# Patient Record
Sex: MALE | Race: WHITE
[De-identification: names, ages, dates, MRNs, and addresses within clinical notes are randomized per-mention and may not be internally consistent; named-entity substitution may affect disease eponyms.]

---

## 2019-12-26 ENCOUNTER — HOSPITAL ENCOUNTER (INPATIENT)
Dept: HOSPITAL 95 - ER | Age: 64
LOS: 10 days | Discharge: HOME HEALTH | DRG: 871 | End: 2020-01-05
Attending: HOSPITALIST | Admitting: HOSPITALIST
Payer: COMMERCIAL

## 2019-12-26 VITALS — BODY MASS INDEX: 14.33 KG/M2 | WEIGHT: 100.09 LBS | HEIGHT: 70 IN

## 2019-12-26 DIAGNOSIS — E44.0: ICD-10-CM

## 2019-12-26 DIAGNOSIS — I10: ICD-10-CM

## 2019-12-26 DIAGNOSIS — J10.00: ICD-10-CM

## 2019-12-26 DIAGNOSIS — A41.89: Primary | ICD-10-CM

## 2019-12-26 DIAGNOSIS — J96.21: ICD-10-CM

## 2019-12-26 DIAGNOSIS — Z91.19: ICD-10-CM

## 2019-12-26 DIAGNOSIS — F41.9: ICD-10-CM

## 2019-12-26 DIAGNOSIS — F17.210: ICD-10-CM

## 2019-12-26 DIAGNOSIS — J44.1: ICD-10-CM

## 2019-12-26 DIAGNOSIS — J98.11: ICD-10-CM

## 2019-12-26 DIAGNOSIS — F41.0: ICD-10-CM

## 2019-12-26 LAB
ALBUMIN SERPL BCP-MCNC: 3.5 G/DL (ref 3.4–5)
ALBUMIN/GLOB SERPL: 0.8 {RATIO} (ref 0.8–1.8)
ALT SERPL W P-5'-P-CCNC: 39 U/L (ref 12–78)
ANION GAP SERPL CALCULATED.4IONS-SCNC: 9 MMOL/L (ref 6–16)
AST SERPL W P-5'-P-CCNC: 48 U/L (ref 12–37)
BASOPHILS # BLD AUTO: 0.03 K/MM3 (ref 0–0.23)
BASOPHILS NFR BLD AUTO: 0 % (ref 0–2)
BILIRUB SERPL-MCNC: 0.4 MG/DL (ref 0.1–1)
BUN SERPL-MCNC: 50 MG/DL (ref 8–24)
CALCIUM SERPL-MCNC: 9 MG/DL (ref 8.5–10.1)
CHLORIDE SERPL-SCNC: 100 MMOL/L (ref 98–108)
CO2 SERPL-SCNC: 26 MMOL/L (ref 21–32)
CREAT SERPL-MCNC: 1 MG/DL (ref 0.6–1.2)
DEPRECATED RDW RBC AUTO: 44.1 FL (ref 35.1–46.3)
EOSINOPHIL # BLD AUTO: 0 K/MM3 (ref 0–0.68)
EOSINOPHIL NFR BLD AUTO: 0 % (ref 0–6)
ERYTHROCYTE [DISTWIDTH] IN BLOOD BY AUTOMATED COUNT: 12.2 % (ref 11.7–14.2)
GLOBULIN SER CALC-MCNC: 4.5 G/DL (ref 2.2–4)
GLUCOSE SERPL-MCNC: 107 MG/DL (ref 70–99)
HCT VFR BLD AUTO: 51.3 % (ref 37–53)
HGB BLD-MCNC: 17.3 G/DL (ref 13.5–17.5)
IMM GRANULOCYTES # BLD AUTO: 0.03 K/MM3 (ref 0–0.1)
IMM GRANULOCYTES NFR BLD AUTO: 0 % (ref 0–1)
LYMPHOCYTES # BLD AUTO: 0.78 K/MM3 (ref 0.84–5.2)
LYMPHOCYTES NFR BLD AUTO: 9 % (ref 21–46)
MCHC RBC AUTO-ENTMCNC: 33.7 G/DL (ref 31.5–36.5)
MCV RBC AUTO: 97 FL (ref 80–100)
MONOCYTES # BLD AUTO: 1.25 K/MM3 (ref 0.16–1.47)
MONOCYTES NFR BLD AUTO: 15 % (ref 4–13)
NEUTROPHILS # BLD AUTO: 6.21 K/MM3 (ref 1.96–9.15)
NEUTROPHILS NFR BLD AUTO: 75 % (ref 41–73)
NRBC # BLD AUTO: 0 K/MM3 (ref 0–0.02)
NRBC BLD AUTO-RTO: 0 /100 WBC (ref 0–0.2)
PH BLDA: 7.41 [PH] (ref 7.35–7.45)
PLATELET # BLD AUTO: 196 K/MM3 (ref 150–400)
POTASSIUM SERPL-SCNC: 5.1 MMOL/L (ref 3.5–5.5)
PROT SERPL-MCNC: 8 G/DL (ref 6.4–8.2)
SODIUM SERPL-SCNC: 135 MMOL/L (ref 136–145)
TROPONIN I SERPL-MCNC: <0.015 NG/ML (ref 0–0.04)

## 2019-12-26 PROCEDURE — C9113 INJ PANTOPRAZOLE SODIUM, VIA: HCPCS

## 2019-12-26 NOTE — NUR
PLACED EGG CRATE ON MATTRESS, MOVED PROBE TO l HAND SINCE HE REMOVED IT FROM
R, REPLACED MASK WHEN "CAME OFF WHILE ASLEEP", MEDICATED AS PRESCRIBED, WILL
CONTINUE TO MONITOR AND TREAT AS PRESCRIBED, CALL LIGHT IN REACH, BED IN LOW
POSITION, SITTING UP WATCHING TV AND USING PHONE

## 2019-12-26 NOTE — NUR
received report from day shift, pt a+o, SOB,
SITTING ON BED WITH PIZZA ON TABLE, CPAP RUNNING AT 6/12 AT 35%, denies
pain, move limbs well, profused well, < 3 sec refil, strong pulses, neuro
ck +, ls dim all, abdmn + bt 4q, skin wnl, 94% o2, will continue to monitor
and ck, calllight and possessions within reach, bed in low position, alarm on

## 2019-12-26 NOTE — NUR
WHEN PT ARRIVED TO THE ROOM HE WAS INSTRUCTED TO NOT GET OOB, ESPECIALLY NOT
WITH OUT CALLING FOR HELP, HE WAS GIVEN A URINAL, HE GRAND DAUGHTER WAS HERE
AND CALLED ME INTO THE ROOM. PT WAS STANDING BY THE BED HOLDING ONTO THE
TABLE, SATS IN THE 80'S, GOT HIS JEANS OFF, AND GOT HIM BACK TO BED AND BACK
ON BIPAP, EXPLAINED AGAIN WHY HE CANT DO THIS, THAT HE NEEDS TO USE URINAL IN
BED. BED ALARM ACTIVATED.

## 2019-12-26 NOTE — NUR
pt arrived to pcu 3 via gurney from ed, report obtained from Lindsay BERRY. he
is currently on bipap, lungs are very dim, exp wheezing, hrr, tele in place
running st in low 100's, vs stable, afebrile, iv site is clear and patent,
btx4, abd flat soft nontender, voids without diff, skin c/w/d, maew, reji,
oriented to room layout and call system, call light in reach.

## 2019-12-27 LAB
ANION GAP SERPL CALCULATED.4IONS-SCNC: 5 MMOL/L (ref 6–16)
BASOPHILS # BLD AUTO: 0.01 K/MM3 (ref 0–0.23)
BASOPHILS NFR BLD AUTO: 0 % (ref 0–2)
BUN SERPL-MCNC: 38 MG/DL (ref 8–24)
CALCIUM SERPL-MCNC: 8.2 MG/DL (ref 8.5–10.1)
CHLORIDE SERPL-SCNC: 110 MMOL/L (ref 98–108)
CO2 SERPL-SCNC: 27 MMOL/L (ref 21–32)
CREAT SERPL-MCNC: 0.71 MG/DL (ref 0.6–1.2)
DEPRECATED RDW RBC AUTO: 44.6 FL (ref 35.1–46.3)
EOSINOPHIL # BLD AUTO: 0 K/MM3 (ref 0–0.68)
EOSINOPHIL NFR BLD AUTO: 0 % (ref 0–6)
ERYTHROCYTE [DISTWIDTH] IN BLOOD BY AUTOMATED COUNT: 12.4 % (ref 11.7–14.2)
GLUCOSE SERPL-MCNC: 125 MG/DL (ref 70–99)
HCT VFR BLD AUTO: 42 % (ref 37–53)
HGB BLD-MCNC: 14.2 G/DL (ref 13.5–17.5)
IMM GRANULOCYTES # BLD AUTO: 0.02 K/MM3 (ref 0–0.1)
IMM GRANULOCYTES NFR BLD AUTO: 0 % (ref 0–1)
LYMPHOCYTES # BLD AUTO: 0.51 K/MM3 (ref 0.84–5.2)
LYMPHOCYTES NFR BLD AUTO: 10 % (ref 21–46)
MAGNESIUM SERPL-MCNC: 2.2 MG/DL (ref 1.6–2.4)
MCHC RBC AUTO-ENTMCNC: 33.8 G/DL (ref 31.5–36.5)
MCV RBC AUTO: 98 FL (ref 80–100)
MONOCYTES # BLD AUTO: 0.35 K/MM3 (ref 0.16–1.47)
MONOCYTES NFR BLD AUTO: 7 % (ref 4–13)
NEUTROPHILS # BLD AUTO: 4.46 K/MM3 (ref 1.96–9.15)
NEUTROPHILS NFR BLD AUTO: 83 % (ref 41–73)
NRBC # BLD AUTO: 0 K/MM3 (ref 0–0.02)
NRBC BLD AUTO-RTO: 0 /100 WBC (ref 0–0.2)
PLATELET # BLD AUTO: 156 K/MM3 (ref 150–400)
POTASSIUM SERPL-SCNC: 4.8 MMOL/L (ref 3.5–5.5)
SODIUM SERPL-SCNC: 142 MMOL/L (ref 136–145)

## 2019-12-27 NOTE — NUR
asked to have cpap removed after 5:30, replaced with nc, stats remaining above
alarm level but when it did drop just reminding pt to breath through nose
brought up level from 84 to 94 quickly, asking for breakfast but not
interested in anything offered as snack, call light in reach, will continue to
monitior and treat until share bsr with pt, bed in low position, ns infusing
at 75 with no s/sx of infection or infiltration

## 2019-12-27 NOTE — NUR
BEGINNING SHIFT SUMMARY
ASSUMED CARE OF PT AT 1900. PT IS A/O X4, PT IS ANIXOUS AT TIMES WHEN
BREATHING BECOMES DIFFICULT, PT HAS DIFFICULTY SWALLOWING PILLS DUE TO SOB.
HEART SOUNDS IRREGULAR, LUNG SOUNDS TIGHT WITH EXPIRATORY WHEEZES, PT ON 3L
NASAL CANNULA, RA AT BASELINE, PT ON CPAP AT NIGHT. PT STATED FEELING HOT,
SKIN IS FLUSHED AND WARM TO TOUCH. PT IN TRIPOD POSITION, RT NOTIFIED,
BREATHING TREATMENTS GIVEN. PT IS CURRENTLY SLEEPING, O2 SATURATION ABOUVE
95%, CALL LIGHT IN REACH, BED IN LOWEST POSTION, WILL CONTINUE TO MONITOR.

## 2019-12-27 NOTE — NUR
UPDATE
ASSUMED CARE APPROXIMATELY 0730. PT ALERT AND ORIENTED. VS STABLE. O2 SATS
REMAIN ABOVE 90% ON 3L NC. LS WHEEZES IN THE UPPERS AND DIM IN THE BASES. HR
NSR ON THE MONITOR. DR. POON IN WITH ORDERS FOR TRANSFER TO MEDICAL FLOOR AND
DC TELEMETRY. REPORT CALLED TO MEDICAL FLOOR RN. PT TAKEN UP BY WHEELCHAIR.

## 2019-12-27 NOTE — NUR
SHIFT SUMMARY
PT TRANSFERED FROM PCU REPORT TAKEN FROM GABRIELA BERRY. PT TRANSPORTED BY W/C ON
3L NC. PT COMPLIAING OF SOB, PT STATING AT 93% ON ARRIVAL
.  PT IS EXCERTING EASILY AND
CHOOSING TO USE URINAL WHEN NEEDED. PT HAS A POOR APETITE. PT CHOOSE TO SIT IN
TRIPOD POSITION TO REDUCE AIR HUNGER.  PT'S LUNGS ARE WHEEZY IN THE UPPER
BILATERAL LOBES AND DIMINSHED BILATERALY IN THE LOWER LOBES. PT HAS IV IN LLRA
FLUSHES EASILY, PT REPORTS TENDER TO TOUCH AND DECLINES NEW IV START AT THIS
TIME.

## 2019-12-28 NOTE — NUR
SHIFT SUMMARY
 
PATIENT A/O. MAKES NEEDS KNOWN. PATIENT ATTEMPTED TO BE WEANED FROM OXYGEN
DURING SHIFT AND PATIENT TOLERATED WELL WITHOUT ACTIVITY. WITH ACTIVITY
PATIENT BECAME SOB. SOB MANAGED THROUGH SCHEDULED/PRN BREATHING TX. LN TO
CONTINUE TO MONITOR.

## 2019-12-28 NOTE — NUR
END SHIFT SUMMARY
NO ACUTE CHANGES T/O THE NIGHT. PT SLEPT UNTIL 0300 WHEN HE AWOKEN BECAUSE
"THIS IS WHEN I NOTMALLY GET UP" STATED THE PT. PT IS ON NASAL CANNULA, STATES
THAT HE IS FEELING BETTER TODAY, HAS SOME SOB. CALL LIGHT IN REACH, BED IN
LOWEST POSTION, WILL CONTINUE TO MONITOR UNTIL DAYSHIFT NURSE ARRIVES.

## 2019-12-28 NOTE — NUR
PATIENT SATURATION O2
 
PROVIDER REQUESTED PATIENT TO BE WEANED OFF O2. PATIENT ON ROOM AIR AT THIS
TIME AND SATS ARE AROUND 90%. WILL CONTINUE TO MONITOR

## 2019-12-29 NOTE — NUR
PATIENT SOB/ANXIETY, PROVIDER NOTIFICATION
 
DURING SHIFT CHANGE REPORT, PATIENT BECAME SOB AND AS A RESULT PATIENT PULSE
WENT 'S AND DESAT O2 TO AS LOW AS 80%-84%, RESPIRATORY THERAPY
INTERVENTION OF BIPAP PLACEMENT AND CLEAR INSTRUCTION THAT PATIENT IS TO
CONTACT NURSE OR RESPIRATORY THERAPY TO ASSIST WITH BIPAP OR O2 NC. PROVIDER
NOTIFIED OF POSSIBLE ANXIETY AND OF OCCURENCE OF DESAT. PROVIDER WISHES TO
ROUND ON PATIENT THIS AM BEFORE CHANGES.

## 2019-12-29 NOTE — NUR
0623 CONDITION
STATED HAVING A HARD TIME BREATHING. DID NO HAVE HIS O2 ON; STATED WAS NOT
DOING ANYTHING FOR HIM ANYWAYS. NOTIFIED RT; STATED WOULD BE UP AS SOON
AS THEY COULD AND TO PLACE ON HIS BIPAP. WCTM.

## 2019-12-29 NOTE — NUR
0638
CHECKED IN ON PATIENT. STATED FEELING MUCH BETTER. RT HAS YET TO COME BY.
[facility].

## 2019-12-29 NOTE — NUR
SHIFT SUMMARY
A/O, ABLE TO MAKE NEEDS KNOWN. COOPERATIVE WITH CARE. CALLS AND ANSWERS
QUESTIONS APPROPRIATELY. NO C/O PAIN/DISCOMFORT. INDEPENDENT IN ROOM;
ALTHOUGH, BECOMES DYSPNEIC UPON EXERTION. STATES A GOAL FOR THIS DAY 12/29/19
IS TO GET UP AND MOVE AROUND MORE. NO ACUTE CHANGES NOTED. APPEARED TO REST
MUCH OF SHIFT. BP ELEVATED, BUT APPEARS ON TREND WITH PREVIOUS PRESSURES. RR
REMAINS TACHY. BED IN LOWEST POSITION. CALL LIGHT AND BELONGINGS WITHIN REACH.
WCTM. REPORT TO ONCOMING RN.

## 2019-12-29 NOTE — NUR
SHIFT SUMMARY
 
PATIENT A/O FULLY. PATIENT HAD A MOMENT OF DESAT OCCUR DURING EARLY SHIFT, SEE
NOTES. PROVIDER AWARE. RESPIRATORY THERAPY INTERENTIONS APPEARED TO HAVE BEEN
EFFECTIVE FOR PATIENT. NO ADDITIONAL DESAT OCCURED DURING SHIFT. PATIENT
REMAINS ON PRECAUTIONS. PATIENT ALTERNATING BETWEEN BIPAP AND O2 NC. PATIENT
APPETITE IS POOR. LN TO CONTINUE TO MONITOR.

## 2019-12-30 NOTE — NUR
BEGINNING SHIFT SUMMARY
ASSUMED CARE OF PT AT 1900. PT WAS VISITING WITH FAMILY. PT IS A/O X4, PT
STATES THAT HE IS FEELING A LOT BETTER THAN HE DID YESTERDAY. HEART SOUNDS
IRREGULAR, LUNG SOUNDS CLEAR BUT DIMINISHED, PT ON BIPAP AT THIS TIME. PT HAS
DIFFICULTY SWOLLOWING PILLS DUE TO SOB. PT IS CURRENTLY SLEEPING WITH HIS
BIPAP ON, CALL LIGHT IN REACH, BED IN LOWEST POSTION, WILL CONTINUE TO
MONITOR.

## 2019-12-30 NOTE — NUR
SHIFT SUMMARY
A/O, ABLE TO MAKE NEEDS KNOWN. COOPERATIVE WITH CARE. CALLS AND ANSWERS
QUESTIONS APPROPRIATLEY. APPEARED TO REST MUCH OF SHIFT. WORE BIPAP THROUGHOUT
NIGHT. REMAINS HYPERTENSIVE; ALL OTHER VS WNL/AFEBRILE. NO ACUTE CHANGES NOTED
OVERNIGHT. BED IN LOWEST POSITION. CALL LIGHT AND BELONGINGS WITHIN REACH.
WCTM. REPORT TO ONCOMING RN.

## 2019-12-30 NOTE — NUR
SHIFT SUMMARY
PT ON & OFF 2L O2 VIA NC THOUGHOUT DAY. PT ALSO HAS BIPAP AT BEDSIDE & PLACED
MASK OVER FACE WHEN SOB. PT STATES HE FEELS BETTER THAN YESTERDAY, BUT REMAINS
PRIMARILY IN TRIPOD POSITION. PT DID TOLERATE BEING UP IN THE CHAIR FOR 3-4
HOURS THIS SHIFT. PT DENIES PAIN. DR. ANDREWS NOTIFIED OF POSSIBLE ANXIETY R/T
SOB. ORDERS PLACED TO START TOMORROW FOR A SSRI. MELATONIN ORDERED FOR
NIGHTTIME AS NEEDED. NO OTHER CHANGES IN ASSESSMENT AT THIS TIME. WILL
CONTINUE OT MONITOR UNTIL TURNOVER IS COMPLETE. PT SATING IN THE 90S
THROUGHOUT SHIFT.

## 2019-12-30 NOTE — NUR
Pt sitting in bed with legs in [place] and is in the tripod position. Pt
wearing O2 via NC and is also wearing his BIPAP. Pt denies pain but does
report some anxiety due to difficulty breathing. Pt appears anxious and this
RN offered to come back when he is feeling better. Pt is agreeable.
 
Discussed case with RT, bedside RN Barbara, and Dr Good. Dr Good will order
sertraline and melatonin to help manage symptoms.
 
Palliative Care will F/U for Advanced Care Planning.

## 2019-12-30 NOTE — NUR
PT ON RA
DR. ANDREWS REMOVED PT O2 DURING HIS VISIT THIS AM. PT SATING IN THE LOW 90S ON
RA. PT HAS MAINTAINED THIS FOR APPROX AN HOUR & A HALF. NO OTHER CHANGES. WILL
CONTINUE TO MONITOR

## 2019-12-31 NOTE — NUR
SHIFT SUMMARY
PT AXO, PLEASANT AND COOPERATIVE WITH CARE. PT STATES THAT OVERALL HE FEELS
WORSE THAN HE DID YESTERDAY. PT REMOVED CONT. OXIMETER FROM FINGER. 93% ON 3L
AND C-PAP PRN THROUGHOUT THE DAY FOR SOB. PT SOB WITH ALL EXERTION INCLUDING
EATING AND TAKING MEDICATION. PT DENIES PAIN AND NV. BED IN LOW POSITION, CALL
LIGHT WITHIN REACH.

## 2019-12-31 NOTE — NUR
Pt resting in bed with his eyes closed upon arrival. Pt wakes eaily from soft
voice. Pt denies pain at this time. Pt reports his breathing has improved
since first arriving to the hospital.
 
Pt reports haveing five children most of whom do not live local and many live
out of state. Pt reports being  and living alone.
 
Engaged in therapeutic discussion regarding Advanced Care Planning including
education of disease process. Educated on the importance of routine visits
with PCP and consideration of seeing a pulmonologist routinely. Educated on
trajectory of disease and the importance of continued conversations with PCP
in order to plan accordingly. Suggested high risk readmission program and Pt
is agreeable to learn more. Pt's dyspnea increases throughout the visit. RT
Yong arrives to offer breathing treatment and this RN ends visit.
 
Placed order for Pulmonary Education and consideration of high risk program
and pulmonary rehab if Pt meets criteria.
 
Palliative Care will remain available.

## 2019-12-31 NOTE — NUR
END SHIFT SUMMARY
NO ACUTE CHANGES NOTED T/O THE NIGHT. PT SLEPT T/O THE NIGHT. PT STATED THAT
MORNING MEDS ARE EASIER TO TAKE NOW AND THAT HE IS FEELING BETTER THIS
MORNING. PT IS CURRENTLY ON HIS BIPAP WATCHING TV, CALL LIGHT IN REACH, BED IN
LOWEST POSTION, WILL CONTINUE TO MONITOR UNTIL DAYSHIFT NURSE ARRIVES.

## 2020-01-01 NOTE — NUR
SHIFT SUMMARY
PT FREQUENTLY IN TRIPOD POSITION THIS A.M. BUT ABLE TO LIE @30 DEGREES AND
REST THIS AFTERNOON. LUNG SOUNDS COARSE AND DIMINISHED. PRODUCTIVE COUGH WITH
SMALL AMOUNT OF THICK GREY MUCUS. DENIES ANY PAIN. DECREASED APPETITE.
POSSIBLE DC IN NEXT 2 DAYS IF RESPIRATORY STATES CONTINUES TO IMPROVE.

## 2020-01-01 NOTE — NUR
SHIFT SUMMARY
NO ACUTE CHANGES. PT REPORTS THAT HE FEELS "ABOUT THE SAME" AS FAR AS HIS
ILLNESS GOES. LUNG SOUNDS VERY DECREASED. PT DOES BECOME VERY SOB W/ ANY
EXERTION. REMAINS IN THE TRIPOD POSITION WHEN SITTING UP. WAS ABLE TO LAY
DOWN IN BED THROUGH A LOT OF THE NIGHT WHILE SLEEPING TONIGHT. PT USES 3 L VIA
NASAL CANNULA OR BIPAP PRN WHEN PT FEELS SOB. WHICH WAS FREQUENTLY THIS
EVENING. WORE BIPAP WHEN SLEEPING. VSS. OTHERWISE PT HAD UNEVENTFUL NIGHT.
WILL CONTINUE TO MONITOR.

## 2020-01-02 NOTE — NUR
SHIFT SUMMARY
PATIENT ON RA ALL DAY. FREQUENT TRIPOD POSITIONING AND REPORTS SOB ALTHOUGH
SATS 90-93%. PER TIAGO BERRY WHO CARED FOR PATIENT THIS AM, DR WANTS TO TRY TO
LIMIT BIPAP USE AND KEEP RA AS MUCH AS POSSIBLE. IT SEEMS SOME OF THE SOB MAY
BE CONTRIBUTED TO ANXIETY AS PATIENT DOES REPORT FEELING ANXIOUS. NO OTHER
CHANGES THIS AFTERNOON. POSS DC TOMORROW

## 2020-01-02 NOTE — NUR
ASSUMED CARE OF PT FROM TIAGO BERRY AT 1300. NO ACUTE CHANGES IN PREVIOUS RN
ASSESSMENT. PATIENT DENIES NEEDS OR COMPLAINTS. SATS 93 ON ROOM AIR.

## 2020-01-02 NOTE — NUR
Shift Summary
 
Patient slept intermittently overnight. He feels he is slowly recovering. He
sat up in tripod position for several hours feeling slightly short of breath,
but he was later able to lay down. He states he is unsure if he is quite ready
to discharge today due to ongoing SOB. Paintaining O2 sats on 2L NC or bipap.

## 2020-01-03 NOTE — NUR
HOME O2 EVAL COMPLETED TODAY, SEE RESP NOTES. PT WITH VERY POOR APPETITE T/O
THE SHIFT AND SLEPT ALOT. ALERT, ORIENTED AND PLEASANT WHEN AWAKE. NO ACUTE
CHANGES NOTED THIS SHIFT.

## 2020-01-04 NOTE — NUR
Shift Summary
A/Ox4, patient has been pretty anxious today. Continuous pulse ox has been d/c
by Dr. Good and Bipap machine has been removed out of patient's reach to
encourage use of the Trilogy and make the experience more home-like so patient
can be prepared for discharge tomorrow. Anxiety has been more controlled with
medications and reassurances. Patient has been saturating >90% on RA
throughout the day and assumes tripod position for comfort (pt states this is
his baseline postion). Patient appears to be very malnourished and emaciated,
does not have a good appetite at all. Otherwise, no complaints or acute
concerns at this time.

## 2020-01-04 NOTE — NUR
VEWS 5
Patient had /121 Resp 24  and VEWS 5. Dr. Good was consulted as
well as Charge RN (Christelle) and the decision to not call RRT was made per Dr. Good. Orders received (see "Anxiety" note). Patient is more calm and resting
in bed, repeat vitals: /86 HR 99.

## 2020-01-04 NOTE — NUR
Clonazepam
Per Dr. Good, give first dose of Clonazepam when patient awakens (@1600) and
keep tonight's dose @ 2100. Order updated in EMAR.

## 2020-01-04 NOTE — NUR
Shift Summary
 
Patient slept intermittently overnight. He spent less time in tripod position
that previous nights, and has not required supplemental O2.

## 2020-01-05 NOTE — NUR
Discharge Summary
A/Ox4, pt discharge to home with home health orders. Pt transported via
personal vehicle by daughter-in-law and son. Discharge education provided to
patient with family at bedside. Hard script given to patient/family,
instructed to fill script so patient can have for tonight. Meds faxed to
preferred pharmacy. Personal belongings sent home with patient along with
Trilogy. Patient will be escorted by CNA via w/c once he is done eating lunch.
Family had no questions at this time.

## 2020-01-05 NOTE — NUR
SHIFT SUMMARY
PT HAD NO COMPLAINTS. PT REFUSED TO USE TRILOGY MACHINE. PT SLEPT WITH NC
WITHOUT ISSUE. PT HAD NO ISSUES RELATED TO ANXIETY DURING THE NIGHT. PT
CURRENTLY AWAKE WATCHING TV. CALL LIGHT IN REACH.

## 2020-04-29 ENCOUNTER — HOSPITAL ENCOUNTER (INPATIENT)
Dept: HOSPITAL 95 - ER | Age: 65
LOS: 3 days | Discharge: HOME | DRG: 199 | End: 2020-05-02
Attending: INTERNAL MEDICINE | Admitting: HOSPITALIST
Payer: COMMERCIAL

## 2020-04-29 VITALS — WEIGHT: 91.27 LBS | BODY MASS INDEX: 13.07 KG/M2 | HEIGHT: 70 IN

## 2020-04-29 DIAGNOSIS — J93.0: Primary | ICD-10-CM

## 2020-04-29 DIAGNOSIS — J43.9: ICD-10-CM

## 2020-04-29 DIAGNOSIS — F41.9: ICD-10-CM

## 2020-04-29 DIAGNOSIS — F17.210: ICD-10-CM

## 2020-04-29 DIAGNOSIS — I21.4: ICD-10-CM

## 2020-04-29 DIAGNOSIS — I10: ICD-10-CM

## 2020-04-29 DIAGNOSIS — E87.2: ICD-10-CM

## 2020-04-29 DIAGNOSIS — Z87.01: ICD-10-CM

## 2020-04-29 DIAGNOSIS — J96.01: ICD-10-CM

## 2020-04-29 LAB
ALBUMIN SERPL BCP-MCNC: 4.1 G/DL (ref 3.4–5)
ALBUMIN/GLOB SERPL: 1.1 {RATIO} (ref 0.8–1.8)
ALT SERPL W P-5'-P-CCNC: 56 U/L (ref 12–78)
ANION GAP SERPL CALCULATED.4IONS-SCNC: 10 MMOL/L (ref 6–16)
AST SERPL W P-5'-P-CCNC: 56 U/L (ref 12–37)
BASOPHILS # BLD AUTO: 0.02 K/MM3 (ref 0–0.23)
BASOPHILS NFR BLD AUTO: 0 % (ref 0–2)
BILIRUB SERPL-MCNC: 0.3 MG/DL (ref 0.1–1)
BUN SERPL-MCNC: 59 MG/DL (ref 8–24)
CALCIUM SERPL-MCNC: 9.5 MG/DL (ref 8.5–10.1)
CHLORIDE SERPL-SCNC: 102 MMOL/L (ref 98–108)
CO2 SERPL-SCNC: 30 MMOL/L (ref 21–32)
CREAT SERPL-MCNC: 0.92 MG/DL (ref 0.6–1.2)
DEPRECATED RDW RBC AUTO: 45.8 FL (ref 35.1–46.3)
EOSINOPHIL # BLD AUTO: 0.03 K/MM3 (ref 0–0.68)
EOSINOPHIL NFR BLD AUTO: 0 % (ref 0–6)
ERYTHROCYTE [DISTWIDTH] IN BLOOD BY AUTOMATED COUNT: 12.2 % (ref 11.7–14.2)
GLOBULIN SER CALC-MCNC: 3.6 G/DL (ref 2.2–4)
GLUCOSE SERPL-MCNC: 124 MG/DL (ref 70–99)
HCT VFR BLD AUTO: 49.5 % (ref 37–53)
HGB BLD-MCNC: 16.5 G/DL (ref 13.5–17.5)
IMM GRANULOCYTES # BLD AUTO: 0.07 K/MM3 (ref 0–0.1)
IMM GRANULOCYTES NFR BLD AUTO: 1 % (ref 0–1)
LYMPHOCYTES # BLD AUTO: 0.8 K/MM3 (ref 0.84–5.2)
LYMPHOCYTES NFR BLD AUTO: 6 % (ref 21–46)
MCHC RBC AUTO-ENTMCNC: 33.3 G/DL (ref 31.5–36.5)
MCV RBC AUTO: 100 FL (ref 80–100)
MONOCYTES # BLD AUTO: 0.54 K/MM3 (ref 0.16–1.47)
MONOCYTES NFR BLD AUTO: 4 % (ref 4–13)
NEUTROPHILS # BLD AUTO: 12.69 K/MM3 (ref 1.96–9.15)
NEUTROPHILS NFR BLD AUTO: 90 % (ref 41–73)
NRBC # BLD AUTO: 0 K/MM3 (ref 0–0.02)
NRBC BLD AUTO-RTO: 0 /100 WBC (ref 0–0.2)
PH BLDA: 7.31 [PH] (ref 7.35–7.45)
PLATELET # BLD AUTO: 251 K/MM3 (ref 150–400)
POTASSIUM SERPL-SCNC: 3.8 MMOL/L (ref 3.5–5.5)
PROT SERPL-MCNC: 7.7 G/DL (ref 6.4–8.2)
PROTHROMBIN TIME: 11.5 SEC (ref 9.7–11.5)
SODIUM SERPL-SCNC: 142 MMOL/L (ref 136–145)
TROPONIN I SERPL-MCNC: 0.77 NG/ML (ref 0–0.04)

## 2020-04-29 PROCEDURE — U0003 INFECTIOUS AGENT DETECTION BY NUCLEIC ACID (DNA OR RNA); SEVERE ACUTE RESPIRATORY SYNDROME CORONAVIRUS 2 (SARS-COV-2) (CORONAVIRUS DISEASE [COVID-19]), AMPLIFIED PROBE TECHNIQUE, MAKING USE OF HIGH THROUGHPUT TECHNOLOGIES AS DESCRIBED BY CMS-2020-01-R: HCPCS

## 2020-04-29 PROCEDURE — G0378 HOSPITAL OBSERVATION PER HR: HCPCS

## 2020-04-30 LAB
ALBUMIN SERPL BCP-MCNC: 3.6 G/DL (ref 3.4–5)
ALBUMIN/GLOB SERPL: 1.1 {RATIO} (ref 0.8–1.8)
ALT SERPL W P-5'-P-CCNC: 51 U/L (ref 12–78)
ANION GAP SERPL CALCULATED.4IONS-SCNC: 6 MMOL/L (ref 6–16)
AST SERPL W P-5'-P-CCNC: 53 U/L (ref 12–37)
BILIRUB SERPL-MCNC: 0.4 MG/DL (ref 0.1–1)
BUN SERPL-MCNC: 64 MG/DL (ref 8–24)
CALCIUM SERPL-MCNC: 8.8 MG/DL (ref 8.5–10.1)
CHLORIDE SERPL-SCNC: 105 MMOL/L (ref 98–108)
CK MB CFR SERPL CALC: 3 % (ref 0–4)
CK MB CFR SERPL CALC: 3.4 % (ref 0–4)
CK SERPL-CCNC: 541 U/L (ref 39–308)
CK SERPL-CCNC: 552 U/L (ref 39–308)
CO2 SERPL-SCNC: 31 MMOL/L (ref 21–32)
CREAT SERPL-MCNC: 0.81 MG/DL (ref 0.6–1.2)
DEPRECATED RDW RBC AUTO: 45.2 FL (ref 35.1–46.3)
ERYTHROCYTE [DISTWIDTH] IN BLOOD BY AUTOMATED COUNT: 12.2 % (ref 11.7–14.2)
GLOBULIN SER CALC-MCNC: 3.3 G/DL (ref 2.2–4)
GLUCOSE SERPL-MCNC: 101 MG/DL (ref 70–99)
HCT VFR BLD AUTO: 43.7 % (ref 37–53)
HGB BLD-MCNC: 14.8 G/DL (ref 13.5–17.5)
MCHC RBC AUTO-ENTMCNC: 33.9 G/DL (ref 31.5–36.5)
MCV RBC AUTO: 100 FL (ref 80–100)
NRBC # BLD AUTO: 0 K/MM3 (ref 0–0.02)
NRBC BLD AUTO-RTO: 0 /100 WBC (ref 0–0.2)
PLATELET # BLD AUTO: 223 K/MM3 (ref 150–400)
POTASSIUM SERPL-SCNC: 4.2 MMOL/L (ref 3.5–5.5)
PROT SERPL-MCNC: 6.9 G/DL (ref 6.4–8.2)
SODIUM SERPL-SCNC: 142 MMOL/L (ref 136–145)

## 2020-04-30 PROCEDURE — 0W9B30Z DRAINAGE OF LEFT PLEURAL CAVITY WITH DRAINAGE DEVICE, PERCUTANEOUS APPROACH: ICD-10-PCS | Performed by: EMERGENCY MEDICINE

## 2020-04-30 PROCEDURE — 5A09357 ASSISTANCE WITH RESPIRATORY VENTILATION, LESS THAN 24 CONSECUTIVE HOURS, CONTINUOUS POSITIVE AIRWAY PRESSURE: ICD-10-PCS | Performed by: EMERGENCY MEDICINE

## 2020-04-30 NOTE — NUR
PT RESTING SITTING UP IN BED IN TRIPOD POSITION. PT STATES HE FEELS BETTER
THAN WHEN HE CAME IN JUST HAVING DIFFICULTY GETTING COMFORTABLE. SAYS THE
CHEST TUBE IS A LITTLE UNCOMFORTABLE. ABLE TO HOLD CONVERSATION WITH MILD SOB.
NO DESATING. GAVE FENTANYL FOR CHEST TUBE DISCOMFORT. PT CAN MOVE SELF IN BED.
NO SIGN OF DISTRESS. NO REQUESTS.

## 2020-04-30 NOTE — NUR
Update:
 
Dr. White to patient's room at this time. Uresil chest tube placed to
water-seal. Site remains C/D/I, no crepitus noted. SpO2- remains 95% on 4L/NC.
Denies dyspnea/SOB. Will continue to monitor.

## 2020-04-30 NOTE — NUR
Shift Summary:
 
No significant changes. Patient had SOB when sitting at bedside to use urinal,
recovered with rest, spO2 remains 92-97% on 3L/NC. Patient frequently sits in
tri-pod position, but denies dyspnea/SOB. Was able to lay back with HOB
elevated and sleep off/on throughout shift. Chest to lt anterior chest wall
placed to water-seal by Dr. White this morning. Chest tube site remains
C/D/I, no crepitus. Call light in reach, makes needs known.

## 2020-04-30 NOTE — NUR
SHIFT SUMMARY
 
PT SLEPT T/O NIGHT, AROUSABLE TO VERBAL STIMULI, REMAINS ORIENTED TO SELF,
LOCATION, EVENT AND FOLLOWING DIRECTIONS. CONTINUES TO DENY SOB, BUT STILL HAS
DIFFICULTY SPEAKING IN FULL SENTENCES, O2 SATURATIONS> 95% ON 3L PER NC. CHEST
TUBE REMAINS IN PLACE TO L ANTERIOR CHEST, XRAY THIS MORNING TO VERIFY
PLACEMENT (SEE PREVIOUS NOTE), SITE REMAINS FREE OF CREPITOUS. PT REQUESTS
WATER, EXPLAINED NPO STATUS UNTIL RE-EVALUATED BY PROVIDER TODAY. PT IN BED
WATCHING TELEVISION, CALL LIGHT WITHIN REACH. REPORT GIVEN TO BRANDEE BERRY.

## 2020-04-30 NOTE — NUR
[place] of Care:
 
Care assumed at 0700hr. Patient A/0 x4, sitting upright in bed looking at his
phone. Denies pain or discomfort. SpO2-92-95% on 4L/NC, denies dyspnea/SOB,
but appears slightly SOB while speaking (short sentences). Otherwise appears
comfortable (without distress) while at rest. Uresil chest tube to lt anterior
chest wall to wall suction. Chest tube site WNL, no air leak or crepitus
noted. Peripheral IV x1 patent and intact, NS started at 75ml/hr at shift
change. Tolerated PO fluids and pills without difficulty. Adjust own position
in bed, makes needs known. Call light in reach.

## 2020-05-01 NOTE — NUR
SUMMARY
PT RESTING IN BED. DID NOT SLEEP MUCH LAST NIGHT. DIFFICULT FOR HIM TO GET
COMFORTABLE DUE TO GETTING MORE SOB WHEN HE LAYS DOWN. PT IS ABLE TO SPEAK IN
SHORT SENTENCES WITHOUT ISSUE. STATES HE FEELS BETTER THAN WHEN HE CAME IN.
CHEST TUBE STILL TO WATER SEAL WITHOUT ISSUE. NO ACUTE CHANGES THIS SHIFT.

## 2020-05-01 NOTE — NUR
BEDSIDE REPORT TO SANDIE BERRY. PT HAS CREPITUS TO LEFT SHOULDER AND AROUND CHEST
TUBE INSERTION SITE. PT DENIES SOB. WILL ORDER XRAY AND CALL DR CHAVARRIA.

## 2020-05-01 NOTE — NUR
RECEIVED BEDSIDE REPORT FROM KRISTI REYES. PATIENT SITTING UP IN BED RESTING
ARMS ON BEDSIDE TABLE WITH HEAD DOWN. AWAKES EASILY. DENIES NO PAIN OR SOB, 02
3L/NC IN PLACE. WHEN ASSESSING LEFT CHEST WALL WHERE CHEST TUBE WAS, CREPITUS
NOTED AROUND SITE UP TO SHOULDER AREA. CALL WAS MADE TO DR CHAVARRIA WITH UPDATE.
PER DR CHAVARRIA, WILL MONITOR AND RECHECK XRAY AT 2100 AND CALL WITH RESULTS. NO
OTHER CHANGES NOTED. CONTINUE TO MONITOR AND TX PRN.

## 2020-05-01 NOTE — NUR
SHIFT SUMMARY
CHEST TUBE REMOVED THIS SHIFT. STERILE DRY GAUZE c OCCULSIVE DRESSING PLACED.
SCANT AMOUNT OF BLOOD ON DRESSING. PT DENIES SOB POST REMOVAL. LUNGS
DIMINISHED THROUGHOUT, OCCASIONAL NON PRODUCTIVE COUGH. PT CONTINUES TO HAVE
INCREASED WOB AND SOB c EXERTION. ABLE TO STAND INDEPENDENTLY IN ROOM. POOR
APPETITE TODAY. ANTICIPATE D/C TOMORROW. PT EAGER FOR D/C. STATES HE WILL
LIKELY MOVE IN c ONE OF HIS CHILDREN. COVID TEST RESULTS NEGATIVE. PT REMOVED
FROM ISOLATION. WILL CONTINUE TO MONITOR UNTIL REPORT TO ONCOMING NURSE.

## 2020-05-01 NOTE — NUR
CALLED TO DR CHAVARRIA WITH F/U RESULTS WITH PCXR. PER BRYANT, NP THERE IS STILL A
SMALL PNEUMOTHORAX. PER DR CHAVARRIA, WILL CONTINUE TO MONITOR PATIENT FOR ANY NEW
ONSET OF SYMPTOMS AND TX PRN.

## 2020-05-01 NOTE — NUR
PATIENT REQUESTED FOR SOME PAIN MED FOR GENERALIZED PAIN AND TO LEFT CHEST
WALL INSERTION SITE FROM CHEST TUBE HURTING. FENTANYL 50MG IVP GIVEN PER
ORDERS. CONTINUE TO TX PRN.

## 2020-05-01 NOTE — NUR
ASSUMED CARE OF PT AT 0700. REPORT FROM VALERIY BERRY. PT SITTING UP IN BED IN
TRIPOD POSITION. REPORTS DIFFICULTIES GETTING COMFORTABLE. ABLE TO MOVE
INDEPENDENTLY IN BED. SPEAKING IN FULL SENTANCES BUT HAS INCREASED WOB AND
INCREASED RESP RATE WHEN MOVING OR SPEAKING. LUNGS DECREASED THROUGHOUT c
EXPIRATORY WHEEZES, O2 AT 3L VIA NC, O2 SATS MID 90'S. URESIL TO LEFT
ANTERIOR CHEST WALL. PT REPORTS COUGH s SPUTUM. PT CACHETIC. VSS. WILL
CONTINUE TO MONITOR.

## 2020-05-02 NOTE — NUR
PT D/C'D, INSTRUCTIONS PROVIDED. PORTABLE HOME O2 PROVIDED BY [facility]. PT TO
CALL REP WHEN HE GETS HOME TO SET UP HOME O2. FACE SHEET FAXED TO PULMONARY
REHAB, PT PAMPHLET PROVIDED. PT VERBALIZED UNDERSTANDING OF D/C INSTRUCTIONS.
OTD NAD.

## 2020-05-02 NOTE — NUR
SHIFT SUMMARY
PATIENT DID WELL T/O NIGHT WITH SOME SLEEP NOTED. VSS. DENIES NO CHEST PAIN OR
SOB; 02 2L/NC. LUNGS REMAIN TIGHT BUT CLEAR. LEFT CHEST WALL DRESSING D&I WITH
SMALL AMOUNT OF OLD BLOOD NOTED ON DRESSING. CREPITUS REMAINS THE SAME AROUND
SITE UP TO LEFT SHOULDER. DRANK 1/2 ENSURE DRINK AND ATE SOME ORANGE SHERBERT
ICE CREAM. HARMON'S WELL AND REPOSITIONS SELF IN BED. GENERALIZED WEAKNESS. NO
OTHER CHANGES NOTED AND WILL REPORT OFF TO DAY SHIFT.

## 2020-05-02 NOTE — NUR
PATIENT AWAKE, SITTING UP IN BED. REQUESTED FOR ORANGE SHERBERT ICE CREAM.
VSS. NO CHANGE IS CREPITUS.

## 2020-05-02 NOTE — NUR
ASSUMED CARE OF PT AT 0700. REPORT FROM SANDIE BERRY. PT SITTING UP IN BED.
REPORTS POOR SLEEP D/T GENERALIZED PAIN AND DISTURBANCES. MEDICATED c FENTANYL
AS ORDERED AND ATTEMPTED TO PROVIDE UNINTERRUPTED SLEEP. PT SPEAKING IN FULL
SENTANCES. DENIES SOB. LUNGS DIMINISHED THROUGHOUT. O2 TITRATED TO 1L, O2 SATS
LOW 90'S. DRESSING TO LEFT LATERAL CHEST INTACT, SCANT AMOUNT OF CREPITUS
PALPATED TO LEFT SHOULDER, IMPROVED SINCE LAST NOC. MAEW. VSS. ANTICIPATED
POSSIBLE D/C TODAY POST O2 EVALUATION. WILL CONTINUE TO MONITOR.

## 2020-05-02 NOTE — NUR
PATIENT RESTING QUIETLY IN BED. DENIES NO NEW ISSUES. CREPITUS STILL REMAINS
TO LEFT CHEST WALL TO SHOULDER AREA. CONTINUE TO MONITOR.

## 2020-05-06 ENCOUNTER — HOSPITAL ENCOUNTER (INPATIENT)
Dept: HOSPITAL 95 - ER | Age: 65
LOS: 3 days | Discharge: HOME | DRG: 189 | End: 2020-05-09
Attending: INTERNAL MEDICINE | Admitting: INTERNAL MEDICINE
Payer: COMMERCIAL

## 2020-05-06 VITALS — BODY MASS INDEX: 13.07 KG/M2 | WEIGHT: 91.27 LBS | HEIGHT: 70 IN

## 2020-05-06 DIAGNOSIS — F17.210: ICD-10-CM

## 2020-05-06 DIAGNOSIS — I10: ICD-10-CM

## 2020-05-06 DIAGNOSIS — J96.21: Primary | ICD-10-CM

## 2020-05-06 DIAGNOSIS — J43.9: ICD-10-CM

## 2020-05-06 DIAGNOSIS — D72.829: ICD-10-CM

## 2020-05-06 DIAGNOSIS — J93.12: ICD-10-CM

## 2020-05-06 LAB
ANION GAP SERPL CALCULATED.4IONS-SCNC: 7 MMOL/L (ref 6–16)
BASOPHILS # BLD AUTO: 0.01 K/MM3 (ref 0–0.23)
BASOPHILS NFR BLD AUTO: 0 % (ref 0–2)
BUN SERPL-MCNC: 41 MG/DL (ref 8–24)
CALCIUM SERPL-MCNC: 8.4 MG/DL (ref 8.5–10.1)
CHLORIDE SERPL-SCNC: 99 MMOL/L (ref 98–108)
CO2 SERPL-SCNC: 32 MMOL/L (ref 21–32)
CREAT SERPL-MCNC: 0.78 MG/DL (ref 0.6–1.2)
DEPRECATED RDW RBC AUTO: 43.5 FL (ref 35.1–46.3)
EOSINOPHIL # BLD AUTO: 0.03 K/MM3 (ref 0–0.68)
EOSINOPHIL NFR BLD AUTO: 0 % (ref 0–6)
ERYTHROCYTE [DISTWIDTH] IN BLOOD BY AUTOMATED COUNT: 11.9 % (ref 11.7–14.2)
GLUCOSE SERPL-MCNC: 158 MG/DL (ref 70–99)
HCT VFR BLD AUTO: 45.2 % (ref 37–53)
HGB BLD-MCNC: 15.3 G/DL (ref 13.5–17.5)
IMM GRANULOCYTES # BLD AUTO: 0.11 K/MM3 (ref 0–0.1)
IMM GRANULOCYTES NFR BLD AUTO: 1 % (ref 0–1)
LYMPHOCYTES # BLD AUTO: 0.66 K/MM3 (ref 0.84–5.2)
LYMPHOCYTES NFR BLD AUTO: 4 % (ref 21–46)
MCHC RBC AUTO-ENTMCNC: 33.8 G/DL (ref 31.5–36.5)
MCV RBC AUTO: 99 FL (ref 80–100)
MONOCYTES # BLD AUTO: 1.1 K/MM3 (ref 0.16–1.47)
MONOCYTES NFR BLD AUTO: 6 % (ref 4–13)
NEUTROPHILS # BLD AUTO: 15.68 K/MM3 (ref 1.96–9.15)
NEUTROPHILS NFR BLD AUTO: 89 % (ref 41–73)
NRBC # BLD AUTO: 0 K/MM3 (ref 0–0.02)
NRBC BLD AUTO-RTO: 0 /100 WBC (ref 0–0.2)
PLATELET # BLD AUTO: 256 K/MM3 (ref 150–400)
POTASSIUM SERPL-SCNC: 3.6 MMOL/L (ref 3.5–5.5)
SODIUM SERPL-SCNC: 138 MMOL/L (ref 136–145)

## 2020-05-06 PROCEDURE — 0W9B30Z DRAINAGE OF LEFT PLEURAL CAVITY WITH DRAINAGE DEVICE, PERCUTANEOUS APPROACH: ICD-10-PCS | Performed by: EMERGENCY MEDICINE

## 2020-05-06 NOTE — NUR
PT RECEIVED FROM [facility] VIA STRETCHER AT 1700. PT IS HERE FOR PNEUMOTHORAX, ON 2L
OF O2 FOR SOB, VITALS HAS BEEN STABLE. PT HAS WATER SEAL CHEST TUBE SYSTEM, DR BARRAZA WAS CALLED FOR ORDER TO VERIFY, ORDER TO WALL SUCTION ON LOW SUCTION
TIL TOMORROW UNTIL FURTHER INSTRUCTION. PT C/O 8/10 PAIN ON THE CHEST TUBE
SITE FENTANYL GIVEN X 1 AND WAS EFFECTIVE. PT REQUEST TO GET RESTED AND
REFUSED DINNER. ALSO DISCUSSED CODE STATUS WITH PT AND WISHES, PT DOES NOT
WANT CHEST COMPRESSIONS, INTUBATION AND MEDICATIONS ARE FINE, REPORT GIVEN TO
KRISTI YAN FOR FOLLOW UP REGARDING PT'S WISHES. PT CURRENTLY NOW RESTING IN BED
CALL LIGHTS WITHIN REACH WILL MONITOR

## 2020-05-07 LAB
BASOPHILS # BLD AUTO: 0.02 K/MM3 (ref 0–0.23)
BASOPHILS NFR BLD AUTO: 0 % (ref 0–2)
DEPRECATED RDW RBC AUTO: 43.3 FL (ref 35.1–46.3)
EOSINOPHIL # BLD AUTO: 0.1 K/MM3 (ref 0–0.68)
EOSINOPHIL NFR BLD AUTO: 1 % (ref 0–6)
ERYTHROCYTE [DISTWIDTH] IN BLOOD BY AUTOMATED COUNT: 11.9 % (ref 11.7–14.2)
HCT VFR BLD AUTO: 44.3 % (ref 37–53)
HGB BLD-MCNC: 15.2 G/DL (ref 13.5–17.5)
IMM GRANULOCYTES # BLD AUTO: 0.12 K/MM3 (ref 0–0.1)
IMM GRANULOCYTES NFR BLD AUTO: 1 % (ref 0–1)
LYMPHOCYTES # BLD AUTO: 1.61 K/MM3 (ref 0.84–5.2)
LYMPHOCYTES NFR BLD AUTO: 8 % (ref 21–46)
MCHC RBC AUTO-ENTMCNC: 34.3 G/DL (ref 31.5–36.5)
MCV RBC AUTO: 98 FL (ref 80–100)
MONOCYTES # BLD AUTO: 1.65 K/MM3 (ref 0.16–1.47)
MONOCYTES NFR BLD AUTO: 8 % (ref 4–13)
NEUTROPHILS # BLD AUTO: 16.89 K/MM3 (ref 1.96–9.15)
NEUTROPHILS NFR BLD AUTO: 83 % (ref 41–73)
NRBC # BLD AUTO: 0 K/MM3 (ref 0–0.02)
NRBC BLD AUTO-RTO: 0 /100 WBC (ref 0–0.2)
PLATELET # BLD AUTO: 254 K/MM3 (ref 150–400)

## 2020-05-07 NOTE — NUR
Supportive Therapeutic Visit.
 
Pt is known to this writer from previous hospital stay. Pt is significantly
cachectic appearing.
Pt is A&Ox4 and denies
pain at this time. Pt reports curent regimen is managing pain. Pt also reports
his breathing has improved. During conversation Pt sits with his legs in a
[place] with upper torso leaned forward similar to a tripod position. Engaged
in therapeutic listening as Pt discussed events leading up to this hospital
stay. Continued therapeutic listening allowing Pt to control topics of
discussion. Today's visit to allow raport to be reestablished. Advanced Care
Planning provided during last hospital stay. Will re-enforce education as
needed. Previous Advanced Care Planning created significant anxiety for Pt,
became withdrawn and not very receptive.
Pt's dinner arrives and this RN ended visit.
 
Spoke with Bedside RN Yuni prior to Pt visit and discussed case.
 
Palliative Care will remain available and re visit advanced care planning as
needed or as plan of care changes.

## 2020-05-07 NOTE — NUR
SHIFT SUMMARY
PT A&O; C/O 8 OF 10 PAIN L CHEST WALL; FENTANYL ADMINISTERED PER EMAR; WARM
BLANKET, FLUIDS AND SNACKS OFFERED TO COMFORT PT WHICH HE DENIED; PT SLEPT
SEVERAL HOURS IN BETWEEN INTERVENTION; NSR NOTED ON TELE W/ PVC'S; VSS; O2
SATS >93 ON 2L NC; CHEST TUBE IN PLACE W/ SUCTION; SCANT AMOUNT SANGUINOUS
NOTED IN TUBING; PT CURRENTLY DENIES NEEDS; CALL LIGHT IN REACH; BED IN LOWEST
POSITION; WILL CONTINUE TO MONITOR UNTIL HAND OFF TO DAY SHIFT RN.

## 2020-05-07 NOTE — NUR
Spiritual care visit conducted. Patient tells me about his medical, family and
yony history. Patient says that he hopes there is some sort of God out there
and that he is not really inspired by much anymore. He is holding on to a
little hope that he can improve but states that his fear is that he won't
improve and he will have to go to [facility] for surgery. His air hunger is
exhausting to him. We talk about what he does look forward to and I reinforce
the ways he works through his fear.  I normalize patient's experience and
provide a calming presence and companionship. Patient voices his appreciation
for the visit and shows signs of an elevated mood. I will continue to remain
available to patient and family.

## 2020-05-07 NOTE — NUR
AM NOTE....
 
ASSUMED CARE OF PT APROX 0700. PT IS A&Ox4 AND WAS ADMITTED FOR PNUEMOTHORAX.
PT CURRENTLY HAS CHEST TUBE THAT WAS PLACED IN THE ED. CHEST TUBE IS SET TO
SUCTION, BUBBLING AND FLUCTUATION IS NOTED IN THE BOX. PT'S VS STABLE AT THIS
TIME. L/S CLEAR AND DIM T/O FINE CRACKLES NOTED IN THE RIGHT BASE. NO EDEMA
NOTED ON ASSESSMENT.
 
PULMONOLOGIST CALLED THIS RN, TOLD THIS RN TO TURN OFF THE CHEST TUBE'S
SUCTION AND LEAVE IT ON WATER SEAL, AND THE PT WILL HAVE CHEST XRAY AT 1200,
AND TO PLEASE CALL PROVIDER WITH THE XRAY RESULTS.
 
CALL LIGHT IN REACH WILL CONTINUE TO MONITOR.

## 2020-05-07 NOTE — NUR
SHIFT SUMMARY...
 
NO ACUTE NEGATAIVE CHANGES NOTED THIS SHIFT. PT'S BP HAS BEEN A LITTLE
HYPOTENSIVE BUT PT HAS NOT BEEN SYMPTOMATIC. PT C/O'S OF SEVERE PAIN TO HIS
LEFT UPPER CHEST WALL WHERE THE CHEST TUBE IS PLACED AND HIS "STOMACH" ABD
AREA. PT HAS BEEN GIVEN PAIN MEDICATED PER EMAR, THIS MEDICATION WAS INCREASED
PER PROVIDER'S ORDERS AND HAS SEEMED TO HELP CONTROL THE PT'S PAIN A LITTLE
BETTER. PAIN MANAGMENT EDUCATION WAS PROVIDED TO THE PT, PT VERBALIZED HIS
UNDERSTANDING. PT HAS BEEN VOIDING IN THE URINAL WITH NO BM TODAY. VERY
MINIMAL BLOODY DRAINAGE IS NOTED IN THE CHEST TUBE, THIS AMOUNT IS
UNMEASURABLE AT THIS TIME. CHEST TUBE IS STILL RUNNING TO SUCTION.
 
CALL LIGHT IN REACH WILL CONTINUE TO MONITOR UNTIL REPORT IS GIVEN TO ONCOMING
RN.

## 2020-05-08 LAB
BASOPHILS # BLD AUTO: 0.02 K/MM3 (ref 0–0.23)
BASOPHILS NFR BLD AUTO: 0 % (ref 0–2)
DEPRECATED RDW RBC AUTO: 41.4 FL (ref 35.1–46.3)
EOSINOPHIL # BLD AUTO: 0.26 K/MM3 (ref 0–0.68)
EOSINOPHIL NFR BLD AUTO: 2 % (ref 0–6)
ERYTHROCYTE [DISTWIDTH] IN BLOOD BY AUTOMATED COUNT: 11.7 % (ref 11.7–14.2)
HCT VFR BLD AUTO: 38.3 % (ref 37–53)
HGB BLD-MCNC: 13.2 G/DL (ref 13.5–17.5)
IMM GRANULOCYTES # BLD AUTO: 0.07 K/MM3 (ref 0–0.1)
IMM GRANULOCYTES NFR BLD AUTO: 0 % (ref 0–1)
LYMPHOCYTES # BLD AUTO: 1.62 K/MM3 (ref 0.84–5.2)
LYMPHOCYTES NFR BLD AUTO: 9 % (ref 21–46)
MCHC RBC AUTO-ENTMCNC: 34.5 G/DL (ref 31.5–36.5)
MCV RBC AUTO: 97 FL (ref 80–100)
MONOCYTES # BLD AUTO: 1.42 K/MM3 (ref 0.16–1.47)
MONOCYTES NFR BLD AUTO: 8 % (ref 4–13)
NEUTROPHILS # BLD AUTO: 13.88 K/MM3 (ref 1.96–9.15)
NEUTROPHILS NFR BLD AUTO: 80 % (ref 41–73)
NRBC # BLD AUTO: 0 K/MM3 (ref 0–0.02)
NRBC BLD AUTO-RTO: 0 /100 WBC (ref 0–0.2)
PLATELET # BLD AUTO: 271 K/MM3 (ref 150–400)

## 2020-05-08 NOTE — NUR
PT WILL LIKELY BE DISCHARGED TO HOME TOMORROW, WANTED TO STAY ONE MORE NIGHT,
NO FURTHER CHANGES, JUST PAIN MGMT, CALL LIGHT IN REACH.

## 2020-05-08 NOTE — NUR
SHIFT SUMMARY
PT A&O; VSS; O2 SATS >93 ON 2L NC; UNMEARSURABLE AMOUNT FROM CHEST TUBE; PT
C/O CHEST PAIN AT CHEST TUBE SITE BUT STATES HE FEELS IMPROVED FROM PREVIOUS
SHIFT; MEDICATION ADMINISTERED PER EMAR; PT SLEPT MOST OF THE NIGHT IN BETWEEN
INTERVENTIONS; CALL LIGHT IN REACH; BED IN LOWEST POSITION; WILL CONTINUE TO
MONITOR CLOSELY UNTIL HAND OFF TO DAY SHIFT RN.

## 2020-05-08 NOTE — NUR
CARE ASSUMPTION
 
PT A&O X4. VSS. MONITOR SHOWS NSR, HR 70's. LUNG SOUNDS DIM T/O. SPO2 > 92% ON
4L NC. PT REPORTS RA @ HOME BASELINE. URESIL IN PLACE TO L ANTERIOR CHEST
WALL. PT BREATHING EVENLY & UNLABORED AT THIS TIME. DENIES SOB. PT C/O 5/10
"STOMACHE" PAIN, REPORTING PAIN IS MANAGEABLE AS LONG AS HE "STAYS ON TOP OF
PAIN MEDICIN." MEDICATION PROVIDED PER PT REQUEST/EMAR. WILL CONTINUE TO
MONITOR AND PROVIDE CARE.

## 2020-05-08 NOTE — NUR
pt laying in bed, sits up in bed easily, a/ox3, pleasant and cooperativ with
care, follows commands well, reports pain 5/10, lungs are clear in upper
fields, dim in bases, is on 4 liters 02 via n/c, resp even and unlabored, no
cough noted, hrr, tele in place running sr per monitor, see strip, no edema
noted, ppp+2, cap refill <3sec, vs stable, afebrile, iv sites are clear and
patent, one on left is a bit resistant, btx4, abd flat soft nontender, voids
without diff, skin frail, no open areas, maew, reji, chest up to left
anterior chest wall to suction, no drainage noted, call light in reach.

## 2020-05-09 LAB
BASOPHILS # BLD AUTO: 0.02 K/MM3 (ref 0–0.23)
BASOPHILS NFR BLD AUTO: 0 % (ref 0–2)
DEPRECATED RDW RBC AUTO: 40.6 FL (ref 35.1–46.3)
EOSINOPHIL # BLD AUTO: 0.41 K/MM3 (ref 0–0.68)
EOSINOPHIL NFR BLD AUTO: 3 % (ref 0–6)
ERYTHROCYTE [DISTWIDTH] IN BLOOD BY AUTOMATED COUNT: 11.5 % (ref 11.7–14.2)
HCT VFR BLD AUTO: 39 % (ref 37–53)
HGB BLD-MCNC: 13.4 G/DL (ref 13.5–17.5)
IMM GRANULOCYTES # BLD AUTO: 0.07 K/MM3 (ref 0–0.1)
IMM GRANULOCYTES NFR BLD AUTO: 0 % (ref 0–1)
LYMPHOCYTES # BLD AUTO: 1.81 K/MM3 (ref 0.84–5.2)
LYMPHOCYTES NFR BLD AUTO: 11 % (ref 21–46)
MCHC RBC AUTO-ENTMCNC: 34.4 G/DL (ref 31.5–36.5)
MCV RBC AUTO: 96 FL (ref 80–100)
MONOCYTES # BLD AUTO: 1.22 K/MM3 (ref 0.16–1.47)
MONOCYTES NFR BLD AUTO: 7 % (ref 4–13)
NEUTROPHILS # BLD AUTO: 12.92 K/MM3 (ref 1.96–9.15)
NEUTROPHILS NFR BLD AUTO: 79 % (ref 41–73)
NRBC # BLD AUTO: 0 K/MM3 (ref 0–0.02)
NRBC BLD AUTO-RTO: 0 /100 WBC (ref 0–0.2)
PLATELET # BLD AUTO: 308 K/MM3 (ref 150–400)

## 2020-05-09 NOTE — NUR
SHIFT SUMMARY
 
PT CONTINUES TO BE A&O X4. VSS. MONITOR SHOWS NSR, HR 70's-80's. LUNG SOUNDS
DIM T/O. SPO2 > 92% ON 4L NC. PT DENIES SOB THIS SHIFT. URESIL IN PLACE TO L
ANTERIOR CHEST WALL. PT MEDICATED FOR "STOMACHE" PAIN X3 THIS SHIFT PER PT
REQUEST/EMAR. CXR DONE IN PT RM THIS AM. PT ANTICIPATING DISCHARGE HOME
TODAY. WILL CONTINUE TO MONITOR AND PROVIDE CARE UNTIL REPORT OFF TO DAY SHIFT
RN.

## 2020-05-09 NOTE — NUR
AM ASSESSMENT:
Pt resting in bed, on airvo at 64% fio2. Biox 88%. Pt denies pain. Plan for
dialysis today around 12. Pt seems drowsy at this time. Denies other needs.
Call light in reach. Will monitor.

## 2020-05-09 NOTE — NUR
DISCHARGE:
Pt was given verbal and written discharge instructions, verbalized
understanding, Denies questions. IV discontinued, cath intact. Pt stable at
time of discharge. Left via w/c with uroceal to L chest wall intact. Left via
w/c

## 2020-05-09 NOTE — NUR
AM ASSESSMENT:
Pt resting in bed after having finished breakfast. Denies needs. States that
his abd pain has improved and rates it a 4/10. Denies need for any pain
medications. Uriseal chest tube to L anterior chest wall intact. Pt denies any
SOB or chest pain at this time. Plan for possible discharge home with chest
tube. Call light in reach. Denies needs.

## 2021-11-09 ENCOUNTER — HOSPITAL ENCOUNTER (OUTPATIENT)
Dept: HOSPITAL 95 - LAB | Age: 66
Discharge: HOME | End: 2021-11-09
Attending: FAMILY MEDICINE
Payer: COMMERCIAL

## 2021-11-09 DIAGNOSIS — I50.30: Primary | ICD-10-CM

## 2021-11-09 LAB
ANION GAP SERPL CALCULATED.4IONS-SCNC: 3 MMOL/L (ref 6–16)
BUN SERPL-MCNC: 20 MG/DL (ref 8–24)
CALCIUM SERPL-MCNC: 9.3 MG/DL (ref 8.5–10.1)
CHLORIDE SERPL-SCNC: 95 MMOL/L (ref 98–108)
CO2 SERPL-SCNC: 37 MMOL/L (ref 21–32)
CREAT SERPL-MCNC: 0.6 MG/DL (ref 0.6–1.2)
GLUCOSE SERPL-MCNC: 108 MG/DL (ref 70–99)
POTASSIUM SERPL-SCNC: 4.1 MMOL/L (ref 3.5–5.5)
SODIUM SERPL-SCNC: 135 MMOL/L (ref 136–145)

## 2021-12-14 ENCOUNTER — HOSPITAL ENCOUNTER (INPATIENT)
Dept: HOSPITAL 95 - ER | Age: 66
LOS: 4 days | DRG: 189 | End: 2021-12-18
Attending: HOSPITALIST | Admitting: HOSPITALIST
Payer: COMMERCIAL

## 2021-12-14 VITALS — BODY MASS INDEX: 12.5 KG/M2 | HEIGHT: 70 IN | WEIGHT: 87.3 LBS

## 2021-12-14 DIAGNOSIS — Z71.6: ICD-10-CM

## 2021-12-14 DIAGNOSIS — Z20.822: ICD-10-CM

## 2021-12-14 DIAGNOSIS — L89.212: ICD-10-CM

## 2021-12-14 DIAGNOSIS — J43.9: ICD-10-CM

## 2021-12-14 DIAGNOSIS — Z66: ICD-10-CM

## 2021-12-14 DIAGNOSIS — L89.152: ICD-10-CM

## 2021-12-14 DIAGNOSIS — D64.9: ICD-10-CM

## 2021-12-14 DIAGNOSIS — Z51.5: ICD-10-CM

## 2021-12-14 DIAGNOSIS — E43: ICD-10-CM

## 2021-12-14 DIAGNOSIS — F17.210: ICD-10-CM

## 2021-12-14 DIAGNOSIS — Z74.01: ICD-10-CM

## 2021-12-14 DIAGNOSIS — N17.9: ICD-10-CM

## 2021-12-14 DIAGNOSIS — E87.1: ICD-10-CM

## 2021-12-14 DIAGNOSIS — Z98.890: ICD-10-CM

## 2021-12-14 DIAGNOSIS — I10: ICD-10-CM

## 2021-12-14 DIAGNOSIS — L89.312: ICD-10-CM

## 2021-12-14 DIAGNOSIS — E87.5: ICD-10-CM

## 2021-12-14 DIAGNOSIS — R64: ICD-10-CM

## 2021-12-14 DIAGNOSIS — J96.21: Primary | ICD-10-CM

## 2021-12-14 DIAGNOSIS — Z99.81: ICD-10-CM

## 2021-12-14 DIAGNOSIS — J96.22: ICD-10-CM

## 2021-12-14 LAB
ALBUMIN SERPL BCP-MCNC: 3.1 G/DL (ref 3.4–5)
ALBUMIN/GLOB SERPL: 0.7 {RATIO} (ref 0.8–1.8)
ALT SERPL W P-5'-P-CCNC: 45 U/L (ref 12–78)
ANION GAP SERPL CALCULATED.4IONS-SCNC: 10 MMOL/L (ref 6–16)
ANION GAP SERPL CALCULATED.4IONS-SCNC: 11 MMOL/L (ref 6–16)
AST SERPL W P-5'-P-CCNC: 53 U/L (ref 12–37)
BASOPHILS # BLD AUTO: 0.01 K/MM3 (ref 0–0.23)
BASOPHILS NFR BLD AUTO: 0 % (ref 0–2)
BILIRUB SERPL-MCNC: 0.6 MG/DL (ref 0.1–1)
BUN SERPL-MCNC: 108 MG/DL (ref 8–24)
BUN SERPL-MCNC: 109 MG/DL (ref 8–24)
CALCIUM SERPL-MCNC: 8.7 MG/DL (ref 8.5–10.1)
CALCIUM SERPL-MCNC: 8.9 MG/DL (ref 8.5–10.1)
CHLORIDE SERPL-SCNC: 87 MMOL/L (ref 98–108)
CHLORIDE SERPL-SCNC: 92 MMOL/L (ref 98–108)
CO2 SERPL-SCNC: 30 MMOL/L (ref 21–32)
CO2 SERPL-SCNC: 32 MMOL/L (ref 21–32)
CREAT SERPL-MCNC: 2.35 MG/DL (ref 0.6–1.2)
CREAT SERPL-MCNC: 2.49 MG/DL (ref 0.6–1.2)
DEPRECATED RDW RBC AUTO: 41.9 FL (ref 35.1–46.3)
EOSINOPHIL # BLD AUTO: 0 K/MM3 (ref 0–0.68)
EOSINOPHIL NFR BLD AUTO: 0 % (ref 0–6)
ERYTHROCYTE [DISTWIDTH] IN BLOOD BY AUTOMATED COUNT: 12.1 % (ref 11.7–14.2)
FLUAV RNA SPEC QL NAA+PROBE: NEGATIVE
FLUBV RNA SPEC QL NAA+PROBE: NEGATIVE
GLOBULIN SER CALC-MCNC: 4.7 G/DL (ref 2.2–4)
GLUCOSE SERPL-MCNC: 104 MG/DL (ref 70–99)
GLUCOSE SERPL-MCNC: 115 MG/DL (ref 70–99)
HCT VFR BLD AUTO: 38.8 % (ref 37–53)
HGB BLD-MCNC: 13.2 G/DL (ref 13.5–17.5)
IMM GRANULOCYTES # BLD AUTO: 0.02 K/MM3 (ref 0–0.1)
IMM GRANULOCYTES NFR BLD AUTO: 0 % (ref 0–1)
LYMPHOCYTES # BLD AUTO: 0.52 K/MM3 (ref 0.84–5.2)
LYMPHOCYTES NFR BLD AUTO: 7 % (ref 21–46)
MCHC RBC AUTO-ENTMCNC: 34 G/DL (ref 31.5–36.5)
MCV RBC AUTO: 93 FL (ref 80–100)
MONOCYTES # BLD AUTO: 0.95 K/MM3 (ref 0.16–1.47)
MONOCYTES NFR BLD AUTO: 12 % (ref 4–13)
NEUTROPHILS # BLD AUTO: 6.34 K/MM3 (ref 1.96–9.15)
NEUTROPHILS NFR BLD AUTO: 81 % (ref 41–73)
NRBC # BLD AUTO: 0 K/MM3 (ref 0–0.02)
NRBC BLD AUTO-RTO: 0 /100 WBC (ref 0–0.2)
PH BLDV: 7.27 [PH] (ref 7.34–7.37)
PLATELET # BLD AUTO: 311 K/MM3 (ref 150–400)
POTASSIUM SERPL-SCNC: 6.3 MMOL/L (ref 3.5–5.5)
POTASSIUM SERPL-SCNC: 6.9 MMOL/L (ref 3.5–5.5)
PROT SERPL-MCNC: 7.8 G/DL (ref 6.4–8.2)
RSV RNA SPEC QL NAA+PROBE: NEGATIVE
SARS-COV-2 RNA RESP QL NAA+PROBE: NEGATIVE
SODIUM SERPL-SCNC: 129 MMOL/L (ref 136–145)
SODIUM SERPL-SCNC: 133 MMOL/L (ref 136–145)
TROPONIN I SERPL-MCNC: 0.02 NG/ML (ref 0–0.04)

## 2021-12-14 PROCEDURE — A9270 NON-COVERED ITEM OR SERVICE: HCPCS

## 2021-12-15 LAB
ANION GAP SERPL CALCULATED.4IONS-SCNC: 14 MMOL/L (ref 6–16)
ANION GAP SERPL CALCULATED.4IONS-SCNC: 8 MMOL/L (ref 6–16)
BASOPHILS # BLD AUTO: 0.01 K/MM3 (ref 0–0.23)
BASOPHILS NFR BLD AUTO: 0 % (ref 0–2)
BUN SERPL-MCNC: 103 MG/DL (ref 8–24)
BUN SERPL-MCNC: 104 MG/DL (ref 8–24)
CALCIUM SERPL-MCNC: 8.5 MG/DL (ref 8.5–10.1)
CALCIUM SERPL-MCNC: 8.8 MG/DL (ref 8.5–10.1)
CHLORIDE SERPL-SCNC: 95 MMOL/L (ref 98–108)
CHLORIDE SERPL-SCNC: 95 MMOL/L (ref 98–108)
CO2 SERPL-SCNC: 26 MMOL/L (ref 21–32)
CO2 SERPL-SCNC: 30 MMOL/L (ref 21–32)
CREAT SERPL-MCNC: 2.11 MG/DL (ref 0.6–1.2)
CREAT SERPL-MCNC: 2.12 MG/DL (ref 0.6–1.2)
DEPRECATED RDW RBC AUTO: 41.5 FL (ref 35.1–46.3)
EOSINOPHIL # BLD AUTO: 0 K/MM3 (ref 0–0.68)
EOSINOPHIL NFR BLD AUTO: 0 % (ref 0–6)
ERYTHROCYTE [DISTWIDTH] IN BLOOD BY AUTOMATED COUNT: 12.1 % (ref 11.7–14.2)
GLUCOSE SERPL-MCNC: 86 MG/DL (ref 70–99)
GLUCOSE SERPL-MCNC: 88 MG/DL (ref 70–99)
HCT VFR BLD AUTO: 32.4 % (ref 37–53)
HGB BLD-MCNC: 11 G/DL (ref 13.5–17.5)
IMM GRANULOCYTES # BLD AUTO: 0.01 K/MM3 (ref 0–0.1)
IMM GRANULOCYTES NFR BLD AUTO: 0 % (ref 0–1)
LYMPHOCYTES # BLD AUTO: 0.19 K/MM3 (ref 0.84–5.2)
LYMPHOCYTES NFR BLD AUTO: 2 % (ref 21–46)
MCHC RBC AUTO-ENTMCNC: 34 G/DL (ref 31.5–36.5)
MCV RBC AUTO: 94 FL (ref 80–100)
MONOCYTES # BLD AUTO: 0.51 K/MM3 (ref 0.16–1.47)
MONOCYTES NFR BLD AUTO: 7 % (ref 4–13)
NEUTROPHILS # BLD AUTO: 7.1 K/MM3 (ref 1.96–9.15)
NEUTROPHILS NFR BLD AUTO: 91 % (ref 41–73)
NRBC # BLD AUTO: 0 K/MM3 (ref 0–0.02)
NRBC BLD AUTO-RTO: 0 /100 WBC (ref 0–0.2)
PH BLDV: 7.28 [PH] (ref 7.34–7.37)
PLATELET # BLD AUTO: 260 K/MM3 (ref 150–400)
POTASSIUM SERPL-SCNC: 6 MMOL/L (ref 3.5–5.5)
POTASSIUM SERPL-SCNC: 6.1 MMOL/L (ref 3.5–5.5)
SODIUM SERPL-SCNC: 133 MMOL/L (ref 136–145)
SODIUM SERPL-SCNC: 135 MMOL/L (ref 136–145)
TROPONIN I SERPL-MCNC: 0.02 NG/ML (ref 0–0.04)

## 2021-12-15 PROCEDURE — 5A09457 ASSISTANCE WITH RESPIRATORY VENTILATION, 24-96 CONSECUTIVE HOURS, CONTINUOUS POSITIVE AIRWAY PRESSURE: ICD-10-PCS | Performed by: HOSPITALIST

## 2021-12-16 LAB
ANION GAP SERPL CALCULATED.4IONS-SCNC: 13 MMOL/L (ref 6–16)
ANION GAP SERPL CALCULATED.4IONS-SCNC: 9 MMOL/L (ref 6–16)
BUN SERPL-MCNC: 110 MG/DL (ref 8–24)
BUN SERPL-MCNC: 113 MG/DL (ref 8–24)
CALCIUM SERPL-MCNC: 8.4 MG/DL (ref 8.5–10.1)
CALCIUM SERPL-MCNC: 8.4 MG/DL (ref 8.5–10.1)
CHLORIDE SERPL-SCNC: 104 MMOL/L (ref 98–108)
CHLORIDE SERPL-SCNC: 106 MMOL/L (ref 98–108)
CO2 SERPL-SCNC: 22 MMOL/L (ref 21–32)
CO2 SERPL-SCNC: 25 MMOL/L (ref 21–32)
CREAT SERPL-MCNC: 1.47 MG/DL (ref 0.6–1.2)
CREAT SERPL-MCNC: 1.54 MG/DL (ref 0.6–1.2)
GLUCOSE SERPL-MCNC: 60 MG/DL (ref 70–99)
GLUCOSE SERPL-MCNC: 90 MG/DL (ref 70–99)
POTASSIUM SERPL-SCNC: 5.5 MMOL/L (ref 3.5–5.5)
POTASSIUM SERPL-SCNC: 6 MMOL/L (ref 3.5–5.5)
PROT UR STRIP-MCNC: (no result) MG/DL
RBC #/AREA URNS HPF: (no result) /HPF (ref 0–2)
SODIUM SERPL-SCNC: 139 MMOL/L (ref 136–145)
SODIUM SERPL-SCNC: 140 MMOL/L (ref 136–145)
SP GR SPEC: 1.02 (ref 1–1.02)
UROBILINOGEN UR STRIP-MCNC: (no result) MG/DL
WBC #/AREA URNS HPF: (no result) /HPF (ref 0–5)

## 2021-12-17 LAB
ANION GAP SERPL CALCULATED.4IONS-SCNC: 12 MMOL/L (ref 6–16)
BASOPHILS # BLD AUTO: 0.02 K/MM3 (ref 0–0.23)
BASOPHILS NFR BLD AUTO: 0 % (ref 0–2)
BUN SERPL-MCNC: 127 MG/DL (ref 8–24)
CALCIUM SERPL-MCNC: 8.2 MG/DL (ref 8.5–10.1)
CHLORIDE SERPL-SCNC: 104 MMOL/L (ref 98–108)
CO2 SERPL-SCNC: 24 MMOL/L (ref 21–32)
CREAT SERPL-MCNC: 1.27 MG/DL (ref 0.6–1.2)
DEPRECATED RDW RBC AUTO: 45.6 FL (ref 35.1–46.3)
EOSINOPHIL # BLD AUTO: 0 K/MM3 (ref 0–0.68)
EOSINOPHIL NFR BLD AUTO: 0 % (ref 0–6)
ERYTHROCYTE [DISTWIDTH] IN BLOOD BY AUTOMATED COUNT: 13 % (ref 11.7–14.2)
GLUCOSE SERPL-MCNC: 159 MG/DL (ref 70–99)
HCT VFR BLD AUTO: 31.8 % (ref 37–53)
HGB BLD-MCNC: 10.6 G/DL (ref 13.5–17.5)
IMM GRANULOCYTES # BLD AUTO: 0.02 K/MM3 (ref 0–0.1)
IMM GRANULOCYTES NFR BLD AUTO: 0 % (ref 0–1)
LYMPHOCYTES # BLD AUTO: 0.16 K/MM3 (ref 0.84–5.2)
LYMPHOCYTES NFR BLD AUTO: 2 % (ref 21–46)
MCHC RBC AUTO-ENTMCNC: 33.3 G/DL (ref 31.5–36.5)
MCV RBC AUTO: 97 FL (ref 80–100)
MONOCYTES # BLD AUTO: 0.65 K/MM3 (ref 0.16–1.47)
MONOCYTES NFR BLD AUTO: 7 % (ref 4–13)
NEUTROPHILS # BLD AUTO: 8.7 K/MM3 (ref 1.96–9.15)
NEUTROPHILS NFR BLD AUTO: 91 % (ref 41–73)
NRBC # BLD AUTO: 0 K/MM3 (ref 0–0.02)
NRBC BLD AUTO-RTO: 0 /100 WBC (ref 0–0.2)
PH BLDA: 7.27 [PH] (ref 7.35–7.45)
PLATELET # BLD AUTO: 124 K/MM3 (ref 150–400)
POTASSIUM SERPL-SCNC: 6.1 MMOL/L (ref 3.5–5.5)
SODIUM SERPL-SCNC: 140 MMOL/L (ref 136–145)

## 2021-12-18 LAB
ALBUMIN SERPL BCP-MCNC: 2.1 G/DL (ref 3.4–5)
ANION GAP SERPL CALCULATED.4IONS-SCNC: 6 MMOL/L (ref 6–16)
BUN SERPL-MCNC: 133 MG/DL (ref 8–24)
CALCIUM SERPL-MCNC: 8.2 MG/DL (ref 8.5–10.1)
CHLORIDE SERPL-SCNC: 108 MMOL/L (ref 98–108)
CO2 SERPL-SCNC: 26 MMOL/L (ref 21–32)
CREAT SERPL-MCNC: 1.04 MG/DL (ref 0.6–1.2)
GLUCOSE SERPL-MCNC: 147 MG/DL (ref 70–99)
PH BLDA: 7.09 [PH] (ref 7.35–7.45)
PHOSPHATE SERPL-MCNC: 6.1 MG/DL (ref 2.5–4.9)
POTASSIUM SERPL-SCNC: 5.7 MMOL/L (ref 3.5–5.5)
SODIUM SERPL-SCNC: 140 MMOL/L (ref 136–145)

## 2023-08-25 NOTE — NUR
66 year old male admitted to the hospital with Respiratory Failure with
Hypoxia Hypercapnia. Pt's medical history and comorbidities include: COPD,
HTN, Pneumothorax, Tobacco use Disorder, Sever Protein Calorie Malnutrition
with Pulmonary Cachxia, and Chronic Anemia.
 
Joint visit with Dr Milton, this PC RN, and Primary RN Ninfa. Family at
bedside.
Dr Milton
provides update and discusses severity of Pt's health. Dr Milton answers
questions. Conversation regarding goals, values, and the importance of
planning for the future. Pt remains sitting up on AIRVO in the tripod
position. He does not provide any input or thoughts during conversation.
Therapeutic listening and questions answered. Provided Palliative Care contact
information and instructed to call with any questions or concerns.
 
Spoke with Care Coordinator Diann and discussed case.
 
Palliative Care will remain available.
ASSUMED CARE OF PT AT 1900. A/OX4 BUT EXTREMELY LETHARGIC. PRIOR TO SHIFT
CHANGE, PATIENT REMOVED HIS AIRVO AND DESATTED INTO THE 30'S. PATIENT IS ON
BIPAP NOW 88% FIO2 AND MAINTAINING SATS ABOVE 95%. DIMINISHED T/O AND
CONTINUES TO FIND COMFORT IN THE TRIPOD POSITION. PATIENT REPORTS PAIN 8-9/10
FOR ALL OVER BODY PAINS. SL ROXINOL DOESN'T SEEM TO HELP A TON, BUT FINDS
RELIEF IN THE IV FENT. NOW SLEEPING. SR ON TELEMETRY WITH FAINT PULSES T/O.
BUE 2+ EDEMA, AND 3+ BLE. URINE IS YELLOW BUT HAS HIGH AMOUNT OF SEDIMENT.
STILL PATENT AND DRAINING TO GRAVITY. PATIENT REFUSES REPOSITIONING TO CLEAN
BEDDING OR DO Q2 TURNS D/T INCREASED PAIN.
WILL REPORT ANY CHANGES THAT OCCUR.
ASSUMED CARE OF PT AT 1900. A/OX4 BUT VERY LETHARGIC. VERY CACHEXIC AND WEAK.
REPORTS NO PAIN AT THE MOMENT. PUPILS R 2 SLUG, L 4 SLUG. OTHER NEURO'S WNL.
MAINTAINING 95% AND ABOVE ON AIRVO 45L/30%. LS EXP WHEEZE IN UPPER LOBES AND
DIM AT BASES. SR ON TELEMETRY AVG 90'S WITH FAINT/THREADY PULSES. BP STABLE.
2+ PITTING EDEMA BUE, AND 3+ IN BLE. MULTIPLE ECCHYMOSIS SPOTS T/O. PETECHIAE
LOCATED ON BL FEET, ABRASION TO R HIP, AND BRUISING TO L ELBOW. PATIENT IS
VERY WITHDRAWN WITH A DEPRESSED MOOD.
WILL UPDATE AS CHANGES OCCUR.
Call back -
 
Pt was placed on comfort care. Pt's family present. Some time provided for 2
sons to reminisce. All were in a somewhat somber mood, and open for a prayer.
Verbal supplication offered on pt's and family's behalf. They verbalized
gratitude and GD was tearful. Family appear to be grieving appropriately.
END OF SHIFT SUMMARY:
     PATIENT RESPIRATORY DECLINING, WAS PLACED ON BIPAP AT THE END OF SHIFT,
AND O2 WAS ABLE TO BE DROPPED, HOWEVER, THROUGH THE NIGHT PATIENT HAD TO BE
INCREASED ON PRESSURE AND SPO2. CO2 100 AND pH OF 7.09, CRITICAL VALUE
NOTIFIED AND RT CONSULTED. CURRENT BIPAP SETTINGS 19/7 70 SPO2.
ROXANOL AND FENTANYL FOR PAIN MANAGEMENT AND AIR HUNGER AS NEEDED.
PATIENT HAS BEEN SR, BM DURING THE NIGHT. L BUTTOCK WOUNDS DRESSING CHANGE.
CLINIMIMX INFUSING AT 75. EDEMATOUS IN ALL 4 EXTREMEITIES AND THIRD SPACING,
WITH MINOR WEEPING OF THE LEFT ARM. COMFORT CARE CONSULT NEEDED. WILL
CONTINUE TO MONITOR
INITIAL ASSESSMENT:
 
Patient is resting with eyes closed, easily awakens with verbal stimuli.
Patient is oriented x4. He reports 8/10 pain to BLE, medicated with 10mg
oxycodone. HRR. LS dylan dim t/o with some faint exp wheezing t/o. Patient has
bi-pap in place settings 18/10 fio2 30% biox mid 90s, pt is tolerating it
well. BT+. PPP. Patient has 2+ pitting edema to bue and ble. He has some
purplish discoloration to bilat ring fingers with what appears to be some
blistering around the knuckle. He has bruising scattered t/o. Peteichae to
bilat feet, also with some blistering noted. He has an abrasion to his right
hip with an intact scab, a small pressure ulcer noted to right buttock. VSS.
Pt was sleeping in the tripod position with head hung, he states he is
comfortable and does not want to be repositioned. US at the bedside to perform
BLE scan. Call light in reach, will continue to monitor.
PT REMAINS A&OX4- SLEEPING ON AND OFF DURING SHIFT. VSS. C/O GENERALIZED PAIN-
ROXINOL (5-10MG)X2 WITH ADEQUATE CONTROL OF PAIN PER PT. FC MAINTAINED WITH
AUO. NO BM. CONTINUES TO HAVE POOR PO INTAKE- CLINAMIX STARTED.
 
TRANSITIONED FROM BIPAP TO HFNC- TOLERATING CURRENT SETTINGS, CONTINUES TRIPOD
POSITIONING. HOWARD SKIN D/T PT REFUSAL- Q 2HR TURNING AS ALLOWED BY PT.
 
FREQUENT ROUNDS TO ENSURE PT SAFETY. PT IN NO APPARENT DISTRESS AT THIS TIME.
WILL CONTINUE TO MONITOR UNTIL TRANSFER OF CARE TO ONCOMING RN.
PT TRANSITIONED TO COMFORT CARE THIS SHIFT.
PT  AT 1308 WITH FAMILY AT BEDSIDE. PT WAS PREMEDICATED WITH MORPHINE
IV AND ATIVAN PRIOR TO REMOVAL OF BIPAP. POST MORTEM CARE WAS DONE AND
COMPLETED, POWERGLIDE AND CATHETER WAS PULLED. FAMILY WAS GIVEN LISTS OF
. AWAITING FOR FAMILY TO VERIFY .
PT TRANSITIONING TO COMFORT CARE, FAMILY AT BEDSIDE AT THIS TIME, DR OBRIEN
AWARE. PALLIATIVE CARE NURSE ON THE CASE. BP SYSTOLIC WAS 50-60'S, BOLUS GIVEN
500 UNTIL THE REST OF THE FAMILY GETS IN THE ROOM TO MAKE DECISION. BP
SYSTOLIC NOW AT 80'S. HRR REMAINED SINUS AT 60'S, REMAIEND ON BIPAP SETTINGS
19/7, 70% FIO2, PT HAS AIR HUNGER RR 25. WILL MONITOR PT
SHIFT SUMMARY
 
PT ARRIVED FROM ED ON BIPAP AND SETTINGS WERE INCREASED FROM 12/6 TO 18/6 FIO2
30% TO HELP DECREASE C02. PT DOES NOT LIKE BIPAP, PREFERS AIRVO (45L/30%).
ALERT AND ORIENTED, ABLE TO MAKE NEEDS KNOWN. COOPERATIVE WITH PLAN OF CARE,
BUT VERY SHORT OF BREATH. PT IS SITTING UPRIGHT, HUNCHED OVER. UNABLE TO TALK
MUCH OR REPOSITION WITHOUT BECOMING SHORT OF BREATH. TELE READS NSR, NO
C/OCHEST PAIN. DOES C/O PAIN IN HIS FEET - TRAMADOL ORDERED RATHER THAN HOME
OXY AS MD DOES NOT TO WORSEN RESP DRIVE. PT STATES HE USUALLY WALKS
INDEPENDENTLY, BUT CANT NOW. HTN - HYDRALAZINE GIVEN. VSS.
 
CALL LIGHT WITHIN REACH, BED IN LOWEST POSITION, WILL CONTINUE TO MONITOR.
SHIFT SUMMARY;
 
ASSUMED CARE AT 0700. BIPAP DURING DAY UNTIL LATE AFTERNOON. SWITCHED TO AIR
VO BY RT FOR BREAK FROM BIPAP. AIR VO 80% 45L. MOANS THROUGHOUT SHIFT, SITS IN
TRIPOD POSITION. A/A/OX3. REPOSITIONED AND FLOATED ON PILLOWS. BEDDING CHANGED
DURING SHIFT. PHOTOS TAKEN OF WOUNDS. MULIPLE WOUNDS, BRUISING AND
EXCORIATIONS. HEEL MEPILEX PLACED. MEPILEX TO HIP BONES AND COCCYX. HEELS AND
ARMS FLOATED ON PILLOWS FOR EDEMA. ORAL CARE DURING SHIFT. CLINIMIX INFUSING
AT 75ML/HR. AGUILAR IN PLACE DRAINING TO GRAVITY. HOSPICE DISCUSSED WITH PT BY
DR. OBRIEN. REVALUATE FOR HOSPICE AGAIN TOMORROW. WILL CONTINUE TO MONITOR AND
TREAT UNTIL CHANGE OF SHIFT.
Spoke with Primary KRISTI Toth and reviewed plan of care. Pt's ABG shows decline
with Pt appearing weak, frail, and uncomfortable. Pt remains in the tripod
position for most of the day.
 
Pt sitting up in bed in tripod position and is wearing BIPAP. Pt able to speak
in only one to two word sentences. Engaged in therapeutic discussion regarding
goals of care. Discussed options including current plan of care and
considering comfort care. Pt states he still has some fight left in him. Pt
does express concerns that his sinuses are plugged up. Ended visit to allow Pt
to rest.
 
Spoke with KRISTI Choi and relayed Pt's concerns regarding his sinuses.
 
Palliative Care will remain available for supportive and therapeutic visits.
Summary:
 
Patient has been resting t/o the day, easily awakens with verbal stimuli.
He has been on Bi-Pap for the majority of the shift settings 18/10 with fio2
30%, saturations have remained in the mid to low 90s. He had a few breaks and
was placed on high flow 45L fio2 30%, saturations remained in the mid to low
90s. Other VSS. Patient has been sleeping in the tri-pod position and has been
refusing to move in the bed stating, " I don't want to be bothered." This am
potassium was 6.1, after calcium gluconate, insulin, d50 his potassium is down
to 5.9. Kidney function is slowly improving. Patient has had litte PO intake
with encouragement today, he is very malnourished. Patient is currently
resting comfortably in bed, call light in reach, will continue to monitor.
UPDATE:
 
A family meeting was initiated, pts son zack and wife along with
children were at bedside. Dr. Milton and palliative care RN Zack to talk with
family regarding disease process, diagnosis and prognosis. Patient and family
verbalized understanding. Pts assessment is unchanged. He was able to take
about an hour break from the Bi-pap earlier for about an hour, he tolerated
the high flow at 45L fio2 30%. Patient and family deny other needs at this
time, call light in reach. Will continue to monitor.
pt unresponsive and hypotensive. requiring full bipapa support and fluid
bolus. Family notified of his decline and needs. We discussed his prognosis
and transitioned him to comfort only.
The patient is a 89y Female complaining of syncope.